# Patient Record
Sex: FEMALE | Race: WHITE | Employment: STUDENT | URBAN - METROPOLITAN AREA
[De-identification: names, ages, dates, MRNs, and addresses within clinical notes are randomized per-mention and may not be internally consistent; named-entity substitution may affect disease eponyms.]

---

## 2021-11-09 ENCOUNTER — TELEPHONE (OUTPATIENT)
Dept: OTOLARYNGOLOGY | Facility: CLINIC | Age: 26
End: 2021-11-09

## 2021-11-09 NOTE — TELEPHONE ENCOUNTER
Patient states she is now stationed in [place] but will be coming back for the holidays. States she would like to have the consultation if possible sooner through a Virtual Appointment.  Aware he does not do virtual but would like to see if he can make an

## 2021-11-10 NOTE — TELEPHONE ENCOUNTER
Per pt wants to make sure she will be scheduled for surgery for a tonsillectomy before the end of the year and asking what records are needed from [place].  Please advise

## 2021-11-10 NOTE — TELEPHONE ENCOUNTER
Advised pt that in order to do virtual visit , pt will need to be in states of [place] .  Pt will keep appointment 12/10

## 2021-11-10 NOTE — TELEPHONE ENCOUNTER
Advised pt that cannot promise pt will be scheduled for surgery as  would have to assess her and determine if she is a candidate for surgery. Pt verbalized understanding.

## 2021-12-10 ENCOUNTER — OFFICE VISIT (OUTPATIENT)
Dept: OTOLARYNGOLOGY | Facility: CLINIC | Age: 26
End: 2021-12-10
Payer: COMMERCIAL

## 2021-12-10 VITALS — BODY MASS INDEX: 33.99 KG/M2 | HEIGHT: 61 IN | WEIGHT: 180 LBS | TEMPERATURE: 97 F

## 2021-12-10 DIAGNOSIS — J36 PERITONSILLAR ABSCESS: Primary | ICD-10-CM

## 2021-12-10 PROCEDURE — 3008F BODY MASS INDEX DOCD: CPT | Performed by: OTOLARYNGOLOGY

## 2021-12-10 PROCEDURE — 99203 OFFICE O/P NEW LOW 30 MIN: CPT | Performed by: OTOLARYNGOLOGY

## 2021-12-10 RX ORDER — CITALOPRAM 10 MG/1
TABLET ORAL
COMMUNITY
Start: 2021-11-15

## 2021-12-11 NOTE — PROGRESS NOTES
Carmelita Pugh is a 31year old female. Patient presents with:   Tonsil Problem: per pt had tonsil abscess in sept 22, abscess had to be drained and than 4 weeks after , abscess reoccurred       HISTORY OF PRESENT ILLNESS  She presents with a history o and syncope. GI Negative Abdominal pain and diarrhea. Endocrine Negative Cold intolerance and heat intolerance. Neuro Negative Tremors. Psych Negative Anxiety and depression.    Integumentary Negative Frequent skin infections, pigment change and gustavo We did discuss the fact that 1 peritonsillar abscess is not an absolute indication for tonsillectomy. She has only had 2 issues with her tonsils over the past month and generally has maybe 1 and most 2 acute infections requiring antibiotics.   At this time